# Patient Record
Sex: MALE | Race: WHITE | Employment: FULL TIME | URBAN - METROPOLITAN AREA
[De-identification: names, ages, dates, MRNs, and addresses within clinical notes are randomized per-mention and may not be internally consistent; named-entity substitution may affect disease eponyms.]

---

## 2020-01-01 ENCOUNTER — HOSPITAL ENCOUNTER (OUTPATIENT)
Age: 65
Discharge: HOME | End: 2020-01-01
Attending: EMERGENCY MEDICINE
Payer: COMMERCIAL

## 2020-01-01 VITALS
TEMPERATURE: 98 F | DIASTOLIC BLOOD PRESSURE: 66 MMHG | OXYGEN SATURATION: 98 % | BODY MASS INDEX: 25 KG/M2 | SYSTOLIC BLOOD PRESSURE: 104 MMHG | RESPIRATION RATE: 18 BRPM | HEART RATE: 92 BPM | WEIGHT: 170 LBS

## 2020-01-01 DIAGNOSIS — H91.93 BILATERAL HEARING LOSS, UNSPECIFIED HEARING LOSS TYPE: Primary | ICD-10-CM

## 2020-01-01 PROCEDURE — 99204 OFFICE O/P NEW MOD 45 MIN: CPT

## 2020-01-01 PROCEDURE — 99203 OFFICE O/P NEW LOW 30 MIN: CPT

## 2020-01-01 RX ORDER — CEFDINIR 300 MG/1
300 CAPSULE ORAL 2 TIMES DAILY
Qty: 20 CAPSULE | Refills: 0 | Status: SHIPPED | OUTPATIENT
Start: 2020-01-01 | End: 2020-01-11

## 2020-01-01 RX ORDER — AMOXICILLIN 500 MG/1
CAPSULE ORAL
COMMUNITY
Start: 2019-12-25 | End: 2020-01-06 | Stop reason: ALTCHOICE

## 2020-01-01 NOTE — ED PROVIDER NOTES
Patient Seen in: [address] [facility]      History   Patient presents with:  Ear Problem Pain    Stated Complaint: Can't hear    HPI    This patient complains of bilateral hearing loss.   Patient stated he had flulike symptoms 1 wee patient have ENT follow-up and may need audiology evaluation              Disposition and Plan     Clinical Impression:  Bilateral hearing loss, unspecified hearing loss type  (primary encounter diagnosis)    Disposition:  Discharge  1/1/2020  9:41 am    F

## 2020-01-01 NOTE — ED INITIAL ASSESSMENT (HPI)
ON 12/25 PATIENT WAS SEEN BY A TELE DOCTOR AND PRESCRIBED AN ABX FOR A URI. REPORTS INCREASED MUFFLED HEARING OVER THE PAST WEEK WITH WORSENING SYMPTOMS THIS MORNING. PATIENT STATES HE IS UNABLE TO HEAR FROM EITHER EAR.

## 2020-01-08 ENCOUNTER — ANESTHESIA (OUTPATIENT)
Dept: SURGERY | Facility: HOSPITAL | Age: 65
End: 2020-01-08
Payer: COMMERCIAL

## 2020-01-08 ENCOUNTER — ANESTHESIA EVENT (OUTPATIENT)
Dept: SURGERY | Facility: HOSPITAL | Age: 65
End: 2020-01-08
Payer: COMMERCIAL

## 2020-01-08 ENCOUNTER — HOSPITAL ENCOUNTER (OUTPATIENT)
Facility: HOSPITAL | Age: 65
Setting detail: HOSPITAL OUTPATIENT SURGERY
Discharge: HOME | End: 2020-01-08
Attending: OTOLARYNGOLOGY | Admitting: OTOLARYNGOLOGY
Payer: COMMERCIAL

## 2020-01-08 VITALS
HEIGHT: 69.5 IN | SYSTOLIC BLOOD PRESSURE: 119 MMHG | OXYGEN SATURATION: 96 % | DIASTOLIC BLOOD PRESSURE: 79 MMHG | BODY MASS INDEX: 25.54 KG/M2 | HEART RATE: 72 BPM | WEIGHT: 176.38 LBS | RESPIRATION RATE: 18 BRPM | TEMPERATURE: 97 F

## 2020-01-08 DIAGNOSIS — H65.03 NON-RECURRENT ACUTE SEROUS OTITIS MEDIA OF BOTH EARS: ICD-10-CM

## 2020-01-08 DIAGNOSIS — H90.6 MIXED HEARING LOSS, BILATERAL: ICD-10-CM

## 2020-01-08 PROCEDURE — 099500Z DRAINAGE OF RIGHT MIDDLE EAR WITH DRAINAGE DEVICE, OPEN APPROACH: ICD-10-PCS | Performed by: OTOLARYNGOLOGY

## 2020-01-08 PROCEDURE — 099600Z DRAINAGE OF LEFT MIDDLE EAR WITH DRAINAGE DEVICE, OPEN APPROACH: ICD-10-PCS | Performed by: OTOLARYNGOLOGY

## 2020-01-08 DEVICE — TUBE VENT ARMSTRONG 140242: Type: IMPLANTABLE DEVICE | Site: EAR | Status: FUNCTIONAL

## 2020-01-08 RX ORDER — IBUPROFEN 200 MG
200 TABLET ORAL EVERY 4 HOURS PRN
Status: CANCELLED | OUTPATIENT
Start: 2020-01-08

## 2020-01-08 RX ORDER — ONDANSETRON 4 MG/1
4 TABLET, ORALLY DISINTEGRATING ORAL EVERY 6 HOURS PRN
Status: CANCELLED | OUTPATIENT
Start: 2020-01-08

## 2020-01-08 RX ORDER — SODIUM CHLORIDE, SODIUM LACTATE, POTASSIUM CHLORIDE, CALCIUM CHLORIDE 600; 310; 30; 20 MG/100ML; MG/100ML; MG/100ML; MG/100ML
INJECTION, SOLUTION INTRAVENOUS CONTINUOUS
Status: DISCONTINUED | OUTPATIENT
Start: 2020-01-08 | End: 2020-01-08

## 2020-01-08 RX ORDER — ACETAMINOPHEN 500 MG
1000 TABLET ORAL EVERY 6 HOURS PRN
COMMUNITY

## 2020-01-08 RX ORDER — HYDROCODONE BITARTRATE AND ACETAMINOPHEN 5; 325 MG/1; MG/1
2 TABLET ORAL AS NEEDED
Status: DISCONTINUED | OUTPATIENT
Start: 2020-01-08 | End: 2020-01-08

## 2020-01-08 RX ORDER — LIDOCAINE HYDROCHLORIDE 10 MG/ML
INJECTION, SOLUTION EPIDURAL; INFILTRATION; INTRACAUDAL; PERINEURAL AS NEEDED
Status: DISCONTINUED | OUTPATIENT
Start: 2020-01-08 | End: 2020-01-08 | Stop reason: SURG

## 2020-01-08 RX ORDER — ONDANSETRON 2 MG/ML
4 INJECTION INTRAMUSCULAR; INTRAVENOUS EVERY 6 HOURS PRN
Status: CANCELLED | OUTPATIENT
Start: 2020-01-08

## 2020-01-08 RX ORDER — HYDROCODONE BITARTRATE AND ACETAMINOPHEN 5; 325 MG/1; MG/1
1 TABLET ORAL AS NEEDED
Status: DISCONTINUED | OUTPATIENT
Start: 2020-01-08 | End: 2020-01-08

## 2020-01-08 RX ORDER — SODIUM CHLORIDE 9 MG/ML
INJECTION, SOLUTION INTRAVENOUS CONTINUOUS
Status: DISCONTINUED | OUTPATIENT
Start: 2020-01-08 | End: 2020-01-08

## 2020-01-08 RX ORDER — ACETAMINOPHEN 500 MG
1000 TABLET ORAL ONCE
Status: DISCONTINUED | OUTPATIENT
Start: 2020-01-08 | End: 2020-01-08 | Stop reason: HOSPADM

## 2020-01-08 RX ORDER — HYDROMORPHONE HYDROCHLORIDE 1 MG/ML
0.4 INJECTION, SOLUTION INTRAMUSCULAR; INTRAVENOUS; SUBCUTANEOUS EVERY 5 MIN PRN
Status: DISCONTINUED | OUTPATIENT
Start: 2020-01-08 | End: 2020-01-08

## 2020-01-08 RX ORDER — IBUPROFEN 200 MG
600 TABLET ORAL EVERY 4 HOURS PRN
Status: CANCELLED | OUTPATIENT
Start: 2020-01-08

## 2020-01-08 RX ORDER — IBUPROFEN 200 MG
400 TABLET ORAL EVERY 4 HOURS PRN
Status: CANCELLED | OUTPATIENT
Start: 2020-01-08

## 2020-01-08 RX ORDER — NALOXONE HYDROCHLORIDE 0.4 MG/ML
80 INJECTION, SOLUTION INTRAMUSCULAR; INTRAVENOUS; SUBCUTANEOUS AS NEEDED
Status: DISCONTINUED | OUTPATIENT
Start: 2020-01-08 | End: 2020-01-08

## 2020-01-08 RX ORDER — OFLOXACIN 3 MG/ML
SOLUTION AURICULAR (OTIC) AS NEEDED
Status: DISCONTINUED | OUTPATIENT
Start: 2020-01-08 | End: 2020-01-08 | Stop reason: HOSPADM

## 2020-01-08 RX ORDER — KETAMINE HYDROCHLORIDE 50 MG/ML
INJECTION, SOLUTION, CONCENTRATE INTRAMUSCULAR; INTRAVENOUS AS NEEDED
Status: DISCONTINUED | OUTPATIENT
Start: 2020-01-08 | End: 2020-01-08 | Stop reason: SURG

## 2020-01-08 RX ADMIN — LIDOCAINE HYDROCHLORIDE 50 MG: 10 INJECTION, SOLUTION EPIDURAL; INFILTRATION; INTRACAUDAL; PERINEURAL at 16:14:00

## 2020-01-08 RX ADMIN — KETAMINE HYDROCHLORIDE 10 MG: 50 INJECTION, SOLUTION, CONCENTRATE INTRAMUSCULAR; INTRAVENOUS at 16:25:00

## 2020-01-08 RX ADMIN — KETAMINE HYDROCHLORIDE 25 MG: 50 INJECTION, SOLUTION, CONCENTRATE INTRAMUSCULAR; INTRAVENOUS at 16:16:00

## 2020-01-08 NOTE — OPERATIVE REPORT
PATIENT NAME: Nikole Ramirez   MRN: [id number]   DATE OF OPERATION: 1/8/2020     PREOPERATIVE DIAGNOSIS:    1. Acute serous otitis media without rupture, bilateral   2.  Moderately-severe to profound mixed hearing loss, bilateral    POSTOPERATIVE DIAGNOSIS: Again, the otic speculum was placed in the ear and a cerumen loop was used to remove wax. An incision was made using a myringotomy knife. A 1.14 Caro tube was then placed in the eardrum. Once placement was confirmed, drops were then placed in the ear.

## 2020-01-08 NOTE — ANESTHESIA POSTPROCEDURE EVALUATION
[address]etal [id number] Patient Status:  Hospital Outpatient Surgery   Age/Gender 75year old male MRN [id number]   [facility] SURGERY Attending Arthea Schwab, MD   [facility] Day # 0 PCP Shantelle Haq MD       Anesthesia Post-op Note

## 2020-01-08 NOTE — H&P
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 SKIN: no rashes, no suspicious lesions  HEENT: normocephalic;  R TM with cloudiness erythema and irregularity / L TM mild serous changes;   normal  nose, and anterior pharynx.  PERRLA, EOMI, sclera anicteric, conj No SmartForms are associated with this patient. Infusion Orders     No relevant orders to display.

## 2020-01-08 NOTE — ANESTHESIA PREPROCEDURE EVALUATION
PRE-OP EVALUATION    Patient Name: John Coleman    Pre-op Diagnosis: Non-recurrent acute serous otitis media of both ears [H65.03]  Mixed hearing loss, bilateral [H90.6]    Procedure(s):  BILATERAL MYRINGOTOMY WITH TUBE PLACEMENT    Surgeon(s) and Role: History reviewed. No pertinent surgical history.   Social History    Tobacco Use      Smoking status: Never Smoker      Smokeless tobacco: Never Used    Alcohol use: Yes      Frequency: 2-4 times a month      Drinks per session: 1 or 2      Binge frequency:

## 2020-01-09 NOTE — OR NURSING
Dr Vaughn James returned page(on call for Dr Beth Pinon) reported patient still [facility] (friend here with patient and verbalizing concern). Plan to discharge home and patient to follow up with Dr Beth Pinon in AM if still concerned. Ear drops sent with patient and instructed

## (undated) DEVICE — MYRINGOTOMY PACK-LF: Brand: MEDLINE INDUSTRIES, INC.

## (undated) DEVICE — GAMMEX® NON-LATEX PI ORTHO SIZE 7.5, STERILE POLYISOPRENE POWDER-FREE SURGICAL GLOVE: Brand: GAMMEX